# Patient Record
Sex: FEMALE | Race: WHITE
[De-identification: names, ages, dates, MRNs, and addresses within clinical notes are randomized per-mention and may not be internally consistent; named-entity substitution may affect disease eponyms.]

---

## 2018-06-20 ENCOUNTER — HOSPITAL ENCOUNTER (INPATIENT)
Dept: HOSPITAL 17 - NEPD | Age: 38
LOS: 2 days | Discharge: HOME | DRG: 882 | End: 2018-06-22
Attending: PSYCHIATRY & NEUROLOGY | Admitting: PSYCHIATRY & NEUROLOGY
Payer: COMMERCIAL

## 2018-06-20 VITALS
SYSTOLIC BLOOD PRESSURE: 145 MMHG | HEART RATE: 106 BPM | DIASTOLIC BLOOD PRESSURE: 103 MMHG | RESPIRATION RATE: 16 BRPM | TEMPERATURE: 98.1 F | OXYGEN SATURATION: 100 %

## 2018-06-20 VITALS
SYSTOLIC BLOOD PRESSURE: 145 MMHG | RESPIRATION RATE: 16 BRPM | OXYGEN SATURATION: 98 % | HEART RATE: 69 BPM | TEMPERATURE: 98.4 F | DIASTOLIC BLOOD PRESSURE: 92 MMHG

## 2018-06-20 VITALS — WEIGHT: 180.78 LBS | HEIGHT: 68 IN | BODY MASS INDEX: 27.4 KG/M2

## 2018-06-20 VITALS
RESPIRATION RATE: 16 BRPM | DIASTOLIC BLOOD PRESSURE: 93 MMHG | OXYGEN SATURATION: 98 % | HEART RATE: 76 BPM | SYSTOLIC BLOOD PRESSURE: 142 MMHG

## 2018-06-20 DIAGNOSIS — Z81.8: ICD-10-CM

## 2018-06-20 DIAGNOSIS — Z72.0: ICD-10-CM

## 2018-06-20 DIAGNOSIS — Z91.410: ICD-10-CM

## 2018-06-20 DIAGNOSIS — F43.25: Primary | ICD-10-CM

## 2018-06-20 DIAGNOSIS — R45.851: ICD-10-CM

## 2018-06-20 LAB
ALBUMIN SERPL-MCNC: 3.6 GM/DL (ref 3.4–5)
ALP SERPL-CCNC: 45 U/L (ref 45–117)
ALT SERPL-CCNC: 30 U/L (ref 10–53)
AST SERPL-CCNC: 21 U/L (ref 15–37)
BASOPHILS # BLD AUTO: 0 TH/MM3 (ref 0–0.2)
BASOPHILS NFR BLD: 0.4 % (ref 0–2)
BILIRUB SERPL-MCNC: 0.5 MG/DL (ref 0.2–1)
BUN SERPL-MCNC: 8 MG/DL (ref 7–18)
CALCIUM SERPL-MCNC: 9 MG/DL (ref 8.5–10.1)
CHLORIDE SERPL-SCNC: 107 MEQ/L (ref 98–107)
CREAT SERPL-MCNC: 1.02 MG/DL (ref 0.5–1)
EOSINOPHIL # BLD: 0 TH/MM3 (ref 0–0.4)
EOSINOPHIL NFR BLD: 0.1 % (ref 0–4)
ERYTHROCYTE [DISTWIDTH] IN BLOOD BY AUTOMATED COUNT: 13.9 % (ref 11.6–17.2)
GFR SERPLBLD BASED ON 1.73 SQ M-ARVRAT: 61 ML/MIN (ref 89–?)
GLUCOSE SERPL-MCNC: 89 MG/DL (ref 74–106)
HCO3 BLD-SCNC: 21.8 MEQ/L (ref 21–32)
HCT VFR BLD CALC: 43.1 % (ref 35–46)
HGB BLD-MCNC: 15 GM/DL (ref 11.6–15.3)
LYMPHOCYTES # BLD AUTO: 1.6 TH/MM3 (ref 1–4.8)
LYMPHOCYTES NFR BLD AUTO: 17.1 % (ref 9–44)
MCH RBC QN AUTO: 32.1 PG (ref 27–34)
MCHC RBC AUTO-ENTMCNC: 34.7 % (ref 32–36)
MCV RBC AUTO: 92.5 FL (ref 80–100)
MONOCYTE #: 0.5 TH/MM3 (ref 0–0.9)
MONOCYTES NFR BLD: 5 % (ref 0–8)
NEUTROPHILS # BLD AUTO: 7.3 TH/MM3 (ref 1.8–7.7)
NEUTROPHILS NFR BLD AUTO: 77.4 % (ref 16–70)
PLATELET # BLD: 239 TH/MM3 (ref 150–450)
PMV BLD AUTO: 9 FL (ref 7–11)
PROT SERPL-MCNC: 7.4 GM/DL (ref 6.4–8.2)
RBC # BLD AUTO: 4.66 MIL/MM3 (ref 4–5.3)
SODIUM SERPL-SCNC: 141 MEQ/L (ref 136–145)
WBC # BLD AUTO: 9.4 TH/MM3 (ref 4–11)

## 2018-06-20 PROCEDURE — 84443 ASSAY THYROID STIM HORMONE: CPT

## 2018-06-20 PROCEDURE — 85025 COMPLETE CBC W/AUTO DIFF WBC: CPT

## 2018-06-20 PROCEDURE — 84703 CHORIONIC GONADOTROPIN ASSAY: CPT

## 2018-06-20 PROCEDURE — 80307 DRUG TEST PRSMV CHEM ANLYZR: CPT

## 2018-06-20 PROCEDURE — 80053 COMPREHEN METABOLIC PANEL: CPT

## 2018-06-20 PROCEDURE — 80061 LIPID PANEL: CPT

## 2018-06-20 PROCEDURE — 83036 HEMOGLOBIN GLYCOSYLATED A1C: CPT

## 2018-06-20 PROCEDURE — 99285 EMERGENCY DEPT VISIT HI MDM: CPT

## 2018-06-21 VITALS
HEART RATE: 62 BPM | SYSTOLIC BLOOD PRESSURE: 137 MMHG | DIASTOLIC BLOOD PRESSURE: 90 MMHG | RESPIRATION RATE: 18 BRPM | OXYGEN SATURATION: 98 %

## 2018-06-21 VITALS
RESPIRATION RATE: 16 BRPM | OXYGEN SATURATION: 99 % | DIASTOLIC BLOOD PRESSURE: 88 MMHG | HEART RATE: 78 BPM | TEMPERATURE: 99.6 F | SYSTOLIC BLOOD PRESSURE: 133 MMHG

## 2018-06-21 VITALS
DIASTOLIC BLOOD PRESSURE: 80 MMHG | SYSTOLIC BLOOD PRESSURE: 125 MMHG | HEART RATE: 76 BPM | OXYGEN SATURATION: 98 % | RESPIRATION RATE: 18 BRPM

## 2018-06-21 NOTE — PD
Physical Exam


Date Seen by Provider:  Jun 21, 2018


Time Seen by Provider:  14:26





Data


Data


Last Documented VS





Vital Signs








  Date Time  Temp Pulse Resp B/P (MAP) Pulse Ox O2 Delivery O2 Flow Rate FiO2


 


6/21/18 13:57  62 18 137/90 (106) 98 Room Air  


 


6/21/18 06:50 99.6       








Orders





 Orders


Complete Blood Count With Diff (6/20/18 13:00)


Comprehensive Metabolic Panel (6/20/18 13:00)


Thyroid Stimulating Hormone (6/20/18 13:00)


Ed Urine Pregnancytest Poc (6/20/18 13:00)


Psych Screen (6/20/18 13:00)


Drug Screen, Random Urine (6/20/18 13:00)


Diet Regular Basic (6/20/18 Dinner)


Lorazepam Inj (Ativan Inj) (6/20/18 20:15)


Diet Regular Basic (6/21/18 Breakfast)


Diet Regular Basic (6/21/18 Lunch)


Diet Regular Basic (6/21/18 Dinner)





Labs





Laboratory Tests








Test


  6/20/18


13:10 6/20/18


13:14


 


Urine Opiates Screen NEG  


 


Urine Barbiturates Screen NEG  


 


Urine Amphetamines Screen NEG  


 


Urine Benzodiazepines Screen POS  


 


Urine Cocaine Screen NEG  


 


Urine Cannabinoids Screen NEG  


 


White Blood Count  9.4 TH/MM3 


 


Red Blood Count  4.66 MIL/MM3 


 


Hemoglobin  15.0 GM/DL 


 


Hematocrit  43.1 % 


 


Mean Corpuscular Volume  92.5 FL 


 


Mean Corpuscular Hemoglobin  32.1 PG 


 


Mean Corpuscular Hemoglobin


Concent 


  34.7 % 


 


 


Red Cell Distribution Width  13.9 % 


 


Platelet Count  239 TH/MM3 


 


Mean Platelet Volume  9.0 FL 


 


Neutrophils (%) (Auto)  77.4 % 


 


Lymphocytes (%) (Auto)  17.1 % 


 


Monocytes (%) (Auto)  5.0 % 


 


Eosinophils (%) (Auto)  0.1 % 


 


Basophils (%) (Auto)  0.4 % 


 


Neutrophils # (Auto)  7.3 TH/MM3 


 


Lymphocytes # (Auto)  1.6 TH/MM3 


 


Monocytes # (Auto)  0.5 TH/MM3 


 


Eosinophils # (Auto)  0.0 TH/MM3 


 


Basophils # (Auto)  0.0 TH/MM3 


 


CBC Comment  DIFF FINAL 


 


Differential Comment   


 


Blood Urea Nitrogen  8 MG/DL 


 


Creatinine  1.02 MG/DL 


 


Random Glucose  89 MG/DL 


 


Total Protein  7.4 GM/DL 


 


Albumin  3.6 GM/DL 


 


Calcium Level  9.0 MG/DL 


 


Alkaline Phosphatase  45 U/L 


 


Aspartate Amino Transf


(AST/SGOT) 


  21 U/L 


 


 


Alanine Aminotransferase


(ALT/SGPT) 


  30 U/L 


 


 


Total Bilirubin  0.5 MG/DL 


 


Sodium Level  141 MEQ/L 


 


Potassium Level  4.1 MEQ/L 


 


Chloride Level  107 MEQ/L 


 


Carbon Dioxide Level  21.8 MEQ/L 


 


Anion Gap  12 MEQ/L 


 


Estimat Glomerular Filtration


Rate 


  61 ML/MIN 


 


 


Thyroid Stimulating Hormone


3rd Gen 


  1.550 uIU/ML 


 











MDM


Supervised Visit with CLAUDIA:  No


Narrative Course


This patient came to the ED seeking voluntary psychiatric evaluation.  She was 

evaluated by Dr. Ilene Bernal and medically cleared.  Please see Dr. Bernal's 

notes for those details.


I was contacted by the staff of the psychiatric pod. The patient wants to leave 

before being evaluated by a psychiatrist.  I reviewed the notes on the patient.

  She had a psych screen and it was recommended that she be evaluated by 

psychiatrist before leaving.


I feel the patient would benefit from a psychiatric evaluation, especially 

given that she is feeling suicidal and has threatened herself with a knife in 

the past.


Patient is placed under Baker act.  She is currently awaiting psychiatric 

evaluation.











Yeni Finch Jun 21, 2018 14:29

## 2018-06-22 VITALS
RESPIRATION RATE: 16 BRPM | TEMPERATURE: 98 F | DIASTOLIC BLOOD PRESSURE: 90 MMHG | SYSTOLIC BLOOD PRESSURE: 133 MMHG | OXYGEN SATURATION: 97 % | HEART RATE: 76 BPM

## 2018-06-22 LAB
CHOLEST SERPL-MCNC: 162 MG/DL (ref 120–200)
CHOLESTEROL/ HDL RATIO: 3.12 RATIO
HBA1C MFR BLD: 4.8 % (ref 4.3–6)
HDLC SERPL-MCNC: 51.9 MG/DL (ref 40–60)
LDLC SERPL-MCNC: 55 MG/DL (ref 0–99)
TRIGL SERPL-MCNC: 278 MG/DL (ref 42–150)
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
